# Patient Record
Sex: FEMALE | URBAN - METROPOLITAN AREA
[De-identification: names, ages, dates, MRNs, and addresses within clinical notes are randomized per-mention and may not be internally consistent; named-entity substitution may affect disease eponyms.]

---

## 2017-05-22 ENCOUNTER — HISTORICAL (OUTPATIENT)
Dept: ADMINISTRATIVE | Facility: HOSPITAL | Age: 62
End: 2017-05-22

## 2017-05-22 LAB
ABS NEUT (OLG): 5.21 X10(3)/MCL (ref 2.1–9.2)
ALBUMIN SERPL-MCNC: 4.3 GM/DL (ref 3.4–5)
ALBUMIN/GLOB SERPL: 1 {RATIO}
ALP SERPL-CCNC: 46 UNIT/L (ref 20–120)
ALT SERPL-CCNC: 10 UNIT/L
AST SERPL-CCNC: 15 UNIT/L
BASOPHILS # BLD AUTO: 0.05 X10(3)/MCL
BASOPHILS NFR BLD AUTO: 1 % (ref 0–1)
BILIRUB SERPL-MCNC: 1 MG/DL
BILIRUBIN DIRECT+TOT PNL SERPL-MCNC: 0.1 MG/DL
BILIRUBIN DIRECT+TOT PNL SERPL-MCNC: 0.9 MG/DL
BUN SERPL-MCNC: 21 MG/DL (ref 7–25)
CALCIUM SERPL-MCNC: 8.9 MG/DL (ref 8.4–10.3)
CHLORIDE SERPL-SCNC: 104 MMOL/L (ref 96–110)
CHOLEST SERPL-MCNC: 178 MG/DL
CHOLEST/HDLC SERPL: 2.4 {RATIO} (ref 0–4.4)
CO2 SERPL-SCNC: 27 MMOL/L (ref 24–32)
CREAT SERPL-MCNC: 0.43 MG/DL (ref 0.7–1.1)
DEPRECATED CALCIDIOL+CALCIFEROL SERPL-MC: 35.94 NG/ML (ref 30–80)
EOSINOPHIL # BLD AUTO: 0.06 10*3/UL
EOSINOPHIL NFR BLD AUTO: 1 % (ref 0–5)
ERYTHROCYTE [DISTWIDTH] IN BLOOD BY AUTOMATED COUNT: 11.9 % (ref 11.5–14.5)
EST. AVERAGE GLUCOSE BLD GHB EST-MCNC: 103 MG/DL
GLOBULIN SER-MCNC: 2.9 GM/ML (ref 2.3–3.5)
GLUCOSE SERPL-MCNC: 91 MG/DL (ref 65–99)
HBA1C MFR BLD: 5.2 % (ref 4.7–5.6)
HCT VFR BLD AUTO: 39.5 % (ref 35–46)
HDLC SERPL-MCNC: 74 MG/DL
HGB BLD-MCNC: 12.9 GM/DL (ref 12–16)
IMM GRANULOCYTES # BLD AUTO: 0.02 10*3/UL
IMM GRANULOCYTES NFR BLD AUTO: 0 %
LDLC SERPL CALC-MCNC: 93 MG/DL (ref 0–130)
LYMPHOCYTES # BLD AUTO: 1.91 X10(3)/MCL
LYMPHOCYTES NFR BLD AUTO: 25 % (ref 15–40)
MCH RBC QN AUTO: 30.3 PG (ref 26–34)
MCHC RBC AUTO-ENTMCNC: 32.7 GM/DL (ref 31–37)
MCV RBC AUTO: 92.7 FL (ref 80–100)
MONOCYTES # BLD AUTO: 0.48 X10(3)/MCL
MONOCYTES NFR BLD AUTO: 6 % (ref 4–12)
NEUTROPHILS # BLD AUTO: 5.21 X10(3)/MCL
NEUTROPHILS NFR BLD AUTO: 67 X10(3)/MCL
PLATELET # BLD AUTO: 270 X10(3)/MCL (ref 130–400)
PMV BLD AUTO: 10 FL (ref 7.4–10.4)
POTASSIUM SERPL-SCNC: 4 MMOL/L (ref 3.6–5.2)
PROT SERPL-MCNC: 7.2 GM/DL (ref 6–8)
RBC # BLD AUTO: 4.26 X10(6)/MCL (ref 4–5.2)
SODIUM SERPL-SCNC: 135 MMOL/L (ref 135–146)
TRIGL SERPL-MCNC: 57 MG/DL
TSH SERPL-ACNC: 0.98 MIU/L (ref 0.5–5)
VLDLC SERPL CALC-MCNC: 11 MG/DL
WBC # SPEC AUTO: 7.7 X10(3)/MCL (ref 4.5–11)

## 2017-06-15 ENCOUNTER — HISTORICAL (OUTPATIENT)
Dept: WOUND CARE | Facility: HOSPITAL | Age: 62
End: 2017-06-15

## 2017-07-07 ENCOUNTER — HISTORICAL (OUTPATIENT)
Dept: ADMINISTRATIVE | Facility: HOSPITAL | Age: 62
End: 2017-07-07

## 2022-04-10 ENCOUNTER — HISTORICAL (OUTPATIENT)
Dept: ADMINISTRATIVE | Facility: HOSPITAL | Age: 67
End: 2022-04-10

## 2022-04-24 VITALS
DIASTOLIC BLOOD PRESSURE: 87 MMHG | BODY MASS INDEX: 37.38 KG/M2 | SYSTOLIC BLOOD PRESSURE: 160 MMHG | HEIGHT: 61 IN | WEIGHT: 198 LBS

## 2022-05-03 NOTE — HISTORICAL [facility]
This is a historical note converted from Ann. Formatting and pictures may have been removed.  Please reference Ann for original formatting and attached multimedia. Chief Complaint  Annual  History of Present Illness  Pt is  here for annual gyn. Hx of uterine cancer with hysterectomy in . Denies vaginal bleeding,itching,?or discharge. Denies urinary complaints. Denies pain. Denies breast complaints. Pap done =NIL; 2016=NIL; HPV negative.  Review of Systems  Negative except HPI  Physical Exam  Vitals & Measurements  T:?98.0? ?F ?(Oral)? HR:?76?(Peripheral)? RR:?20? BP:?120/71? HT:?154.94?cm? HT:?154.94?cm? WT:?72.0?kg? WT:?72.0?kg? BMI:?29.99?  ??General: ?Alert and oriented, No acute distress. ?  ??Breast: ?No mass, No tenderness, No discharge. ?  ??Gastrointestinal: ?Soft, Non-tender. ?  ??Gynecology: ?  ??? ? Labia: Bilateral, Majora, Minora, Within normal limits. ?  ??? ? Vagina: Within normal limits. ?  ??? ? Uterus: Absent. ?NO fullness or tenderness  ??Integumentary: ?Warm, Dry. ?  ??Neurologic: ?Alert, Oriented. ?  ??Psychiatric: ?Cooperative, Appropriate mood & affect.??  ??  Assessment/Plan  1.?Visit for routine gyn exam  ?pelvic; pap utd  rtc one year  Ordered:  Clinic Follow up, *Est. 18 3:00:00 CDT, 1 Year, Order for future visit, Visit for routine gyn exam, [facility]  Preventative Health Care Est 40-64 years 125[address] PC, Visit for routine gyn exam, [facility], 17 9:46:00 CDT  ?  2.?Visit for screening mammogram  Ordered:  MG Screening, Routine, *Est. 17 3:00:00 CDT, Screening, None, Ambulatory, Rad Type, Order for future visit, Visit for screening mammogram, Schedule this test, [facility], day(s), 1, month(s), In Approximately, *Est. 17 3:00:00 CDT  ?   Problem List/Past Medical History  Glaucoma  HTN (hypertension)  Hyperlipidemia  IGT (impaired glucose tolerance)  Historical  No historical problems  Procedure/Surgical  History  Appendectomy, Cholecystectomy, AMPARO - Total abdominal hysterectomy.  Medications  hydrochlorothiazide-lisinopril 25 mg-20 mg oral tablet, 1 tab(s), Oral, Daily, 11 refills  Latanoprost 0.005% Eye Dr, 1 drop(s), OPTH, qPM  Multivitamins and Minerals oral tablet, Oral, Daily  pravastatin 40 mg oral tablet, 40 mg, 1 tab(s), Oral, Daily, 11 refills  Allergies  No Known Allergies  Social History  Alcohol - Denies Alcohol Use  Never  Employment/School  Retired  Exercise  Exercise frequency: Daily. Self assessment: Good condition. Exercise type: Walking, Running.  Home/Environment  Lives with Spouse. Living situation: Home/Independent. Injuries/Abuse/Neglect in household: No.  Nutrition/Health  Regular  Other  Sexual  Substance Abuse - Denies Substance Abuse  Never  Tobacco - Denies Tobacco Use  Never smoker  Family History  Alzheimers disease: Father.  Breast cancer: Sister.  Cancer: Sister.     [1]?Office Visit Note; Afua Davalos NP 05/12/2016 07:56 CDT

## 2022-05-03 NOTE — HISTORICAL [facility] Transitions Follow Up Call- 1st attempt     2021    Patient: Juan Riley  Patient : 1944   MRN: [id number]  Reason for Admission: chest pain (pt left AMA to go home and take care of bedridden )  Discharge Date: 21 RARS: Readmission Risk Score: 9 ( )         Attempted to reach patient for subsequent transitional call. VM left to return call to [phone]. Will attempt follow up at a later time.      Follow Up  Future Appointments   Date Time Provider Jovany Chandler   2022  1:00 PM Lay Jha MD [facility] KARY Barajas, RN This is a historical note converted from Cerner. Formatting and pictures may have been removed.  Please reference [employer]ner for original formatting and attached multimedia. Chief Complaint  follow up  History of Present Illness  61 yo WF presents to clinic for f/u IGT, HLD, and HTN.? Reports following low fat, low salt diet. Continues on cholesterol medication. Continues to refrain from all white stuff in her diet. Continues to jog 5 miles daily and jump on trampoline.???No c/o. Has pap scheduled for today  Review of Systems  Constitutional: negative except as stated in HPI  Eye: negative except as stated in HPI  ENMT: negative except as stated in HPI  Respiratory: negative except as stated in HPI  Cardiovascular: negative except as stated in HPI  Gastrointestinal: negative except as stated in HPI  Genitourinary: negative except as stated in HPI  Hema/Lymph: negative except as stated in HPI  Endocrine: negative except as stated in HPI  Immunologic: negative except as stated in HPI  Musculoskeletal: negative except as stated in HPI  Integumentary: negative except as stated in HPI  Neurologic: negative except as stated in HPI  ?   All Other ROS_ ?negative except as stated in HPI  Physical Exam  Vitals & Measurements  T:?36.8? ?C ?(Oral)? HR:?68?(Peripheral)? RR:?16? BP:?120/79? HT:?154.94?cm? HT:?154.94?cm? WT:?73.3?kg? WT:?73.3?kg? BMI:?30.53?  General: Alert and oriented, No acute distress.  Eye: Pupils are equal, round and reactive to light, Extraocular movements are intact.  HENT: Normocephalic.  Neck: Supple, Non-tender, No carotid bruit, No lymphadenopathy.  Respiratory: Lungs are clear to auscultation, Respirations are non-labored, Breath sounds are equal, Symmetrical chest wall expansion.  Cardiovascular: Normal rate, Regular rhythm, No murmur.  Gastrointestinal: Soft, Non-tender, Non-distended, Normal bowel sounds.  Musculoskeletal: Normal range of motion.  Integumentary: Warm, Dry, Intact.  Neurologic: No focal  deficits, Cranial Nerves II-XII are grossly intact.  Assessment/Plan  1.?HTN (hypertension)  ?at goal  cont current meds  low sodium diet  enc regular exercise as tolerated along with maintaining healthy weight  enc smoking cessation  refrain from excessive ETOH consumption  Ordered:  hydroCHLOROthiazide-lisinopril, 1 tab(s), Oral, Daily, # 30 tab(s), 11 Refill(s)  Clinic Follow up, *Est. 02/22/18 3:00:00 CST, 9 mos with JENNIFER Hernandez, Order for future visit, HTN (hypertension)  Hyperlipidemia  IGT (impaired glucose tolerance), [facility]  Office/Outpatient Visit Level 3 Established 13499 PC, HTN (hypertension)  Hyperlipidemia  IGT (impaired glucose tolerance), [facility], 05/22/17 8:50:00 CDT  ?  2.?Hyperlipidemia  labs pending  -cont statin  -cont low chol diet  -encourage regular exercise at least thrice weekly to improve numbers and assist with attaining/maintaining healthy weight  -oatmeal/cheerios daily  Ordered:  pravastatin, 40 mg = 1 tab(s), Oral, Daily, # 30 tab(s), 11 Refill(s)  Clinic Follow up, *Est. 02/22/18 3:00:00 CST, 9 mos with JENNIFER Hernandez, Order for future visit, HTN (hypertension)  Hyperlipidemia  IGT (impaired glucose tolerance), [facility]  Office/Outpatient Visit Level 3 Established 09040 PC, HTN (hypertension)  Hyperlipidemia  IGT (impaired glucose tolerance), [facility], 05/22/17 8:50:00 CDT  ?  3.?IGT (impaired glucose tolerance)  ?cont daily exercises  ADA diet  Ordered:  Clinic Follow up, *Est. 02/22/18 3:00:00 CST, 9 mos with JENNIFER Hernandez, Order for future visit, HTN (hypertension)  Hyperlipidemia  IGT (impaired glucose tolerance), [facility]  Office/Outpatient Visit Level 3 Established [id number] PC, HTN (hypertension)  Hyperlipidemia  IGT (impaired glucose tolerance), [facility], 05/22/17 8:50:00 CDT  ?   Problem List/Past Medical History  Glaucoma  HTN (hypertension)  Hyperlipidemia  IGT (impaired glucose tolerance)  Historical  No historical  problems  Procedure/Surgical History  Appendectomy, Cholecystectomy, AMPARO - Total abdominal hysterectomy.  Medications  hydrochlorothiazide-lisinopril 25 mg-20 mg oral tablet, 1 tab(s), Oral, Daily, 11 refills  Latanoprost 0.005% Eye Dr, 1 drop(s), OPTH, qPM  Multivitamins and Minerals oral tablet, Oral, Daily  pravastatin 40 mg oral tablet, 40 mg, 1 tab(s), Oral, Daily  Allergies  No Known Allergies  Social History  Alcohol - Denies Alcohol Use  Never  Employment/School  Retired  Exercise  Exercise frequency: Daily. Self assessment: Good condition. Exercise type: Walking, Running.  Home/Environment  Lives with Spouse. Living situation: Home/Independent. Injuries/Abuse/Neglect in household: No.  Nutrition/Health  Regular  Other  Sexual  Substance Abuse - Denies Substance Abuse  Never  Tobacco - Denies Tobacco Use  Never smoker  Family History  Alzheimers disease: Father.  Breast cancer: Sister.  Cancer: Sister.  Lab Results  Occult Bld Stl Negative 08/28/2016 07:40 CDT  Occult Bld Stl ll Negative 08/29/2016 04:33 CDT  Occult Bld Stl lll Negative 08/30/2016 12:54 CDT